# Patient Record
Sex: MALE | Race: WHITE | Employment: PART TIME
[De-identification: names, ages, dates, MRNs, and addresses within clinical notes are randomized per-mention and may not be internally consistent; named-entity substitution may affect disease eponyms.]

---

## 2017-11-17 ENCOUNTER — RECORDS - HEALTHEAST (OUTPATIENT)
Dept: ADMINISTRATIVE | Facility: OTHER | Age: 22
End: 2017-11-17

## 2017-11-17 LAB
LAB AP CHARGES (HE HISTORICAL CONVERSION): NORMAL
PATH REPORT.COMMENTS IMP SPEC: NORMAL
PATH REPORT.COMMENTS IMP SPEC: NORMAL
PATH REPORT.FINAL DX SPEC: NORMAL
PATH REPORT.GROSS SPEC: NORMAL
PATH REPORT.MICROSCOPIC SPEC OTHER STN: NORMAL
PATH REPORT.RELEVANT HX SPEC: NORMAL
RESULT FLAG (HE HISTORICAL CONVERSION): NORMAL

## 2017-11-29 ENCOUNTER — RECORDS - HEALTHEAST (OUTPATIENT)
Dept: ADMINISTRATIVE | Facility: OTHER | Age: 22
End: 2017-11-29

## 2017-11-29 ENCOUNTER — AMBULATORY - HEALTHEAST (OUTPATIENT)
Dept: VASCULAR SURGERY | Facility: CLINIC | Age: 22
End: 2017-11-29

## 2017-11-29 ENCOUNTER — OFFICE VISIT - HEALTHEAST (OUTPATIENT)
Dept: VASCULAR SURGERY | Facility: CLINIC | Age: 22
End: 2017-11-29

## 2017-11-29 DIAGNOSIS — T14.8XXA LOCAL INFECTION OF WOUND: ICD-10-CM

## 2017-11-29 DIAGNOSIS — T81.49XA WOUND INFECTION FOLLOWING PROCEDURE: ICD-10-CM

## 2017-11-29 DIAGNOSIS — T81.31XA WOUND DEHISCENCE, SURGICAL: ICD-10-CM

## 2017-11-29 DIAGNOSIS — L08.9 LOCAL INFECTION OF WOUND: ICD-10-CM

## 2017-11-29 RX ORDER — HYDROCODONE BITARTRATE AND ACETAMINOPHEN 5; 325 MG/1; MG/1
1 TABLET ORAL EVERY 4 HOURS PRN
Status: SHIPPED | COMMUNITY
Start: 2017-11-29

## 2017-11-29 RX ORDER — CETIRIZINE HYDROCHLORIDE 10 MG/1
10 TABLET ORAL DAILY
Status: SHIPPED | COMMUNITY
Start: 2017-11-29

## 2017-11-29 ASSESSMENT — MIFFLIN-ST. JEOR: SCORE: 1895.71

## 2017-12-06 ENCOUNTER — OFFICE VISIT - HEALTHEAST (OUTPATIENT)
Dept: VASCULAR SURGERY | Facility: CLINIC | Age: 22
End: 2017-12-06

## 2017-12-06 DIAGNOSIS — L08.9 LOCAL INFECTION OF WOUND: ICD-10-CM

## 2017-12-06 DIAGNOSIS — T14.8XXA LOCAL INFECTION OF WOUND: ICD-10-CM

## 2017-12-06 DIAGNOSIS — T81.31XD POSTOPERATIVE WOUND DEHISCENCE, SUBSEQUENT ENCOUNTER: ICD-10-CM

## 2017-12-06 ASSESSMENT — MIFFLIN-ST. JEOR: SCORE: 1895.71

## 2019-10-02 ENCOUNTER — APPOINTMENT (OUTPATIENT)
Dept: OCCUPATIONAL MEDICINE | Facility: CLINIC | Age: 24
End: 2019-10-02

## 2019-10-02 PROCEDURE — 99000 SPECIMEN HANDLING OFFICE-LAB: CPT | Performed by: FAMILY MEDICINE

## 2020-07-16 ENCOUNTER — APPOINTMENT (OUTPATIENT)
Dept: OCCUPATIONAL MEDICINE | Facility: CLINIC | Age: 25
End: 2020-07-16

## 2020-07-16 PROCEDURE — 99000 SPECIMEN HANDLING OFFICE-LAB: CPT | Performed by: FAMILY MEDICINE

## 2021-02-04 ENCOUNTER — APPOINTMENT (OUTPATIENT)
Dept: OCCUPATIONAL MEDICINE | Facility: CLINIC | Age: 26
End: 2021-02-04

## 2021-02-04 PROCEDURE — 99000 SPECIMEN HANDLING OFFICE-LAB: CPT | Performed by: FAMILY MEDICINE

## 2021-05-31 VITALS — HEIGHT: 73 IN | BODY MASS INDEX: 25.18 KG/M2 | WEIGHT: 190 LBS

## 2021-06-14 NOTE — PROGRESS NOTES
Date of Service:12/6/2017    Provider's initial consult visit:  11/29/2017    Chief Complaint:   Chief Complaint   Patient presents with     Follow-up     Right flank wound       History:   Patient returns to vascular clinic with regards to his back ulcer secondary to an abscess that was removed mid November.  When the sutures were removed, his incision opened and he was started on Keflex.  I initially saw him on 11/29/2017 and started him on Doxycycline secondary to an infection, which he continues to take.  He believes that his ulcer is healing quickly and doing well.  He is able to change the dressing by himself.     Current Outpatient Prescriptions   Medication Sig Dispense Refill     cetirizine (ZYRTEC) 10 MG tablet Take 10 mg by mouth daily.       doxycycline (VIBRA-TABS) 100 MG tablet Take 1 tablet (100 mg total) by mouth 2 (two) times a day for 10 days. 20 tablet 0     HYDROcodone-acetaminophen 5-325 mg per tablet Take 1 tablet by mouth every 4 (four) hours as needed for pain.       No current facility-administered medications for this visit.        No Known Allergies    Physical Exam:    Vitals:    12/06/17 1339   BP: 120/64   Pulse: 68   Resp: 16   Temp: 98.9  F (37.2  C)    Body mass index is 25.07 kg/(m^2).    General:  22 y.o. male in no apparent distress.    Head:  Normocephalic atraumatic  Psychiatric:  Cooperative.   Respiratory: unlabored breathing.    Integumentary:  Skin is uniformly dry and warm        Ulcerations:  There is no kade wound erythema, induration, maceration, denudement, fluctuance or warmth surrounding the ulcer(s). Wound base: 100%  Wound margins: intact, Exudate moderate.  Undermining no, Tunneling yes     Wound 11/29/17 right flank (Active)   Pre Size Length 2 12/6/2017  1:00 PM   Pre Size Width 1.8 12/6/2017  1:00 PM   Pre Size Depth 1 12/6/2017  1:00 PM   Pre Total Sq cm 3.6 12/6/2017  1:00 PM   Post Size Length 0.8 12/6/2017  1:00 PM   Post Size Width 1.5 12/6/2017  1:00 PM    Post Size Depth 0.5 12/6/2017  1:00 PM   Tunneling .5@3:00 12/6/2017  1:00 PM       assessment:    Images:  none pertinent    Labs:    No results found for: WBC, HGB, HCT, MCV, PLT            Invalid input(s): EOSPC    No results found for: CREATININE      No results found for: BUN  No results found for: PREALBUMINESUFAST(LABPROT,LABALBU,albumin)@  Invalid input(s): LABALBU  No results found for: PREALBUMIN    No results found for: CRP  No results found for: SEDRATE  No results found for this or any previous visit.    No results found for: HGBA1C  No results found for: TSH        No results found for: GSEMMRNJ07OR  No results found for: BNP     1. Local infection of wound     2. Postoperative wound dehiscence, subsequent encounter         A new wound was identified today: no.    Plan:  1.  Excisional Debridement of the back ulcer was recommended and after consent was obtained and topical 2% Xylocaine was applied, under clean conditions, using a #15 scalpel, the devitalized subcutaneous  tissue in the ulcer base and at the ulcer margins were sharply excised for a total sq. cm of 3.6.  Following excision, there was minimal bleeding tissue, which was easily controlled and a decrease in the nonviable tissue.  The patient tolerated the procedure without difficulty.     2. Surgical incision wound,  Infection resolved     3.  Treatment Will pack Melgisorb and cover with Tegaderm Foam Adhesive.  This can changed one to two times per week.     4.  Patient to return to clinic in three week(s) for reevaluation             Martha Lockwood, APRN, CNP,  CWCN  [facility]  [phone]

## 2021-06-14 NOTE — PROGRESS NOTES
"Right flank wound secondary to the removal of a \"lump.\" Patient reports he had had the cyst for many years w/o issue, however after taking a long road trip the area became inflamed and painful. Pt reports that it had always louis been the size of a # 2 eraser and that he had not had any issue with it. Dr Lancaster removed growth and sent to pathology, came back as cystic tissue per patient. There is a wound that is 2.0 cm deep, currently packing with wet to dry gauze.   "

## 2021-06-14 NOTE — PROGRESS NOTES
[facility] Vascular [facility] Consult Note    Date of Service:  11/29/2017    Requesting Provider: Vance Lancaster MD      History of Present Illness:     Kalia Higginbotham presents to clinic alone regarding his back ulcer secondary to an abscess that was removed a couple of weeks ago.  When the sutures were removed, his incision opened and he was started on Keflex, which he has finished.  The and he has finished taking his antibiotic.  Over the past week, the pain is gradually getting worse and his mother believes that it is looking deeper.    Review of Symptoms:    Constitutional:  Denies fever, chills or night sweats    Integumentary:   See above. Skin is uniformly warm, dry and pink.    Psych:  No depression/anxiety      ENTM:  good  sight.  Hard of Hearing:      GI:  Nutritional intake:  Appetite is excellent,      Bowels: No concerns     : No Concerns No concerns.     MSK:   Patient is ambulatory; does not use an assistive device :      Neurological:  No weakness, numbness, or tingling    Cardiac:  Denies new chest pain or tightness.    Respiratory:  Denies any unusual SOB    Endocrine:    is not diabetic       Past Medical History:  History reviewed. No pertinent past medical history.     Surgical History: History reviewed. No pertinent surgical history.    Allergies: No Known Allergies       Medications:    Current Outpatient Prescriptions:      cetirizine (ZYRTEC) 10 MG tablet, Take 10 mg by mouth daily., Disp: , Rfl:      doxycycline (VIBRA-TABS) 100 MG tablet, Take 1 tablet (100 mg total) by mouth 2 (two) times a day for 10 days., Disp: 20 tablet, Rfl: 0     HYDROcodone-acetaminophen 5-325 mg per tablet, Take 1 tablet by mouth every 4 (four) hours as needed for pain., Disp: , Rfl:     Family history:   Family History   Problem Relation Age of Onset     Hypothyroidism Mother      Heart attack Paternal Uncle      Suicidality Paternal Uncle      Hypertension Maternal Grandmother      No Medical Problems  "Maternal Grandfather      Kidney cancer Paternal Grandmother      Macular degeneration Paternal Grandfather      Prostate cancer Paternal Grandfather          Social History:   Social History     Social History     Marital status: Single     Spouse name: N/A     Number of children: N/A     Years of education: N/A     Occupational History     Not on file.     Social History Main Topics     Smoking status: Never Smoker     Smokeless tobacco: Never Used     Alcohol use No     Drug use: No     Sexual activity: Yes     Partners: Female     Other Topics Concern     Not on file     Social History Narrative    Lives with his parents.        Physical Exam    General: This is a 22 y.o. male who appears their reported age, not in acute distress    Constitution:  Vital Signs: /74 (Patient Site: Right Arm, Patient Position: Sitting, Cuff Size: Adult Regular)  Pulse 87  Temp 97.6  F (36.4  C) (Oral)   Resp 16  Ht 6' 1\" (1.854 m)  Wt 190 lb (86.2 kg)  BMI 25.07 kg/m2 Body mass index is 25.07 kg/(m^2).    Psychiatric: Alert and oriented X 3, in no apparent distress. Calm and cooperative throughout exam    Head/Neck:  Normocephalic, atraumatic    Cardiovascular:  Rate and Rhythm is regular    Respiratory:  Lungs are clear to auscultation in all fields bilaterally.     Abdomen: Normal bowel sounds. Soft, symmetric, no guarding or rigidity, non tender with palpation.  No organomegaly or masses palpated.     Integumentary/Hair/Nails:  Turgor and texture are normal.    Nails are normal.    Neurological:  Grossly intact.     Vascular:      Ulceration(s)/Wound(s): Shanice wound skin is without denudement, maceration, warmth, induration and or fluctuance  Wound Base: 100 %.  There is some erythema surrounding the ulcer and the ulcer is very tender to touch.    Wound 11/29/17 right flank (Active)   Pre Size Length 2.6 11/29/2017  2:00 PM   Pre Size Width 2.8 11/29/2017  2:00 PM   Pre Size Depth 1.4 11/29/2017  2:00 PM   Pre Total " Sq cm 4.5 11/29/2017  2:00 PM   Tunneling 2.4@3:00 11/29/2017  2:00 PM       Laboratory studies:     No results found for: WBC, HGB, HCT, MCV, PLT  No results found for: CREATININE  No results found for: BUN  No results found for: CRP  No results found for: SEDRATE  No results found for: LABPROT, ALBUMIN  No results found for: HGBA1C    No results found for: TSH      No results found for: BNP  No results found for this or any previous visit.   No results found for this or any previous visit.  No results found for: [id number]   No results found for: [id number]    Imaging:  None pertinent     Assessment:  1. Wound infection following procedure  doxycycline (VIBRA-TABS) 100 MG tablet    Culture/Gram Stain: Wound    Culture, Anaerobic   2. Local infection of wound  Culture/Gram Stain: Wound    Culture, Anaerobic   3. Wound dehiscence, surgical         Assessment/Plan:  1. No debridement secondary to pain.      2. Surgical incision wound, signs of infection today.  I wrote a prescription for Doxycycline.    3.  Treatment Will pack the ulcer with Tegaderm AG Mesh and cover with Tegaderm Foam Adhesive.  This can changed twice per week.    4.  Patient to return to clinic in one week(s) for reevaluation secondary to his infection. They were instructed to call the clinic sooner with any further questions or concerns. Answered all questions.      Martha Lockwood, APRN, CNP, [facility]  [phone]